# Patient Record
Sex: MALE | Race: WHITE | NOT HISPANIC OR LATINO | Employment: FULL TIME | URBAN - METROPOLITAN AREA
[De-identification: names, ages, dates, MRNs, and addresses within clinical notes are randomized per-mention and may not be internally consistent; named-entity substitution may affect disease eponyms.]

---

## 2021-05-31 ENCOUNTER — RECORDS - HEALTHEAST (OUTPATIENT)
Dept: ADMINISTRATIVE | Facility: CLINIC | Age: 41
End: 2021-05-31

## 2024-11-16 ENCOUNTER — HOSPITAL ENCOUNTER (EMERGENCY)
Facility: CLINIC | Age: 44
Discharge: HOME | End: 2024-11-16
Attending: EMERGENCY MEDICINE | Admitting: EMERGENCY MEDICINE
Payer: COMMERCIAL

## 2024-11-16 ENCOUNTER — APPOINTMENT (OUTPATIENT)
Dept: GENERAL RADIOLOGY | Facility: CLINIC | Age: 44
End: 2024-11-16
Attending: EMERGENCY MEDICINE
Payer: COMMERCIAL

## 2024-11-16 VITALS
OXYGEN SATURATION: 95 % | HEIGHT: 67 IN | WEIGHT: 220 LBS | TEMPERATURE: 97.9 F | DIASTOLIC BLOOD PRESSURE: 84 MMHG | HEART RATE: 88 BPM | RESPIRATION RATE: 16 BRPM | BODY MASS INDEX: 34.53 KG/M2 | SYSTOLIC BLOOD PRESSURE: 152 MMHG

## 2024-11-16 DIAGNOSIS — R07.89 RIGHT-SIDED CHEST WALL PAIN: ICD-10-CM

## 2024-11-16 LAB
ANION GAP SERPL CALCULATED.3IONS-SCNC: 9 MMOL/L (ref 7–15)
ATRIAL RATE - MUSE: 83 BPM
BASOPHILS # BLD AUTO: 0 10E3/UL (ref 0–0.2)
BASOPHILS NFR BLD AUTO: 0 %
BUN SERPL-MCNC: 15.6 MG/DL (ref 6–20)
CALCIUM SERPL-MCNC: 9.9 MG/DL (ref 8.8–10.4)
CHLORIDE SERPL-SCNC: 101 MMOL/L (ref 98–107)
CREAT SERPL-MCNC: 1.17 MG/DL (ref 0.67–1.17)
D DIMER PPP FEU-MCNC: <0.27 UG/ML FEU (ref 0–0.5)
DIASTOLIC BLOOD PRESSURE - MUSE: NORMAL MMHG
EGFRCR SERPLBLD CKD-EPI 2021: 78 ML/MIN/1.73M2
EOSINOPHIL # BLD AUTO: 0.3 10E3/UL (ref 0–0.7)
EOSINOPHIL NFR BLD AUTO: 3 %
ERYTHROCYTE [DISTWIDTH] IN BLOOD BY AUTOMATED COUNT: 13.2 % (ref 10–15)
GLUCOSE SERPL-MCNC: 101 MG/DL (ref 70–99)
HCO3 SERPL-SCNC: 28 MMOL/L (ref 22–29)
HCT VFR BLD AUTO: 44.2 % (ref 40–53)
HGB BLD-MCNC: 14.9 G/DL (ref 13.3–17.7)
HOLD SPECIMEN: 0
IMM GRANULOCYTES # BLD: 0 10E3/UL
IMM GRANULOCYTES NFR BLD: 0 %
INTERPRETATION ECG - MUSE: NORMAL
LYMPHOCYTES # BLD AUTO: 2.4 10E3/UL (ref 0.8–5.3)
LYMPHOCYTES NFR BLD AUTO: 25 %
MCH RBC QN AUTO: 30.1 PG (ref 26.5–33)
MCHC RBC AUTO-ENTMCNC: 33.7 G/DL (ref 31.5–36.5)
MCV RBC AUTO: 89 FL (ref 78–100)
MONOCYTES # BLD AUTO: 0.8 10E3/UL (ref 0–1.3)
MONOCYTES NFR BLD AUTO: 9 %
NEUTROPHILS # BLD AUTO: 6.1 10E3/UL (ref 1.6–8.3)
NEUTROPHILS NFR BLD AUTO: 63 %
NRBC # BLD AUTO: 0 10E3/UL
NRBC BLD AUTO-RTO: 0 /100
P AXIS - MUSE: 29 DEGREES
PLATELET # BLD AUTO: 262 10E3/UL (ref 150–450)
POTASSIUM SERPL-SCNC: 5.5 MMOL/L (ref 3.4–5.3)
PR INTERVAL - MUSE: 148 MS
QRS DURATION - MUSE: 100 MS
QT - MUSE: 338 MS
QTC - MUSE: 397 MS
R AXIS - MUSE: -20 DEGREES
RBC # BLD AUTO: 4.95 10E6/UL (ref 4.4–5.9)
SODIUM SERPL-SCNC: 138 MMOL/L (ref 135–145)
SYSTOLIC BLOOD PRESSURE - MUSE: NORMAL MMHG
T AXIS - MUSE: 35 DEGREES
TROPONIN T SERPL HS-MCNC: <6 NG/L
VENTRICULAR RATE- MUSE: 83 BPM
WBC # BLD AUTO: 9.7 10E3/UL (ref 4–11)

## 2024-11-16 PROCEDURE — 99285 EMERGENCY DEPT VISIT HI MDM: CPT | Mod: 25

## 2024-11-16 PROCEDURE — 80048 BASIC METABOLIC PNL TOTAL CA: CPT | Performed by: EMERGENCY MEDICINE

## 2024-11-16 PROCEDURE — 36415 COLL VENOUS BLD VENIPUNCTURE: CPT | Performed by: EMERGENCY MEDICINE

## 2024-11-16 PROCEDURE — 71046 X-RAY EXAM CHEST 2 VIEWS: CPT

## 2024-11-16 PROCEDURE — 85025 COMPLETE CBC W/AUTO DIFF WBC: CPT | Performed by: EMERGENCY MEDICINE

## 2024-11-16 PROCEDURE — 82310 ASSAY OF CALCIUM: CPT | Performed by: EMERGENCY MEDICINE

## 2024-11-16 PROCEDURE — 85379 FIBRIN DEGRADATION QUANT: CPT | Performed by: EMERGENCY MEDICINE

## 2024-11-16 PROCEDURE — 84484 ASSAY OF TROPONIN QUANT: CPT | Performed by: EMERGENCY MEDICINE

## 2024-11-16 PROCEDURE — 93005 ELECTROCARDIOGRAM TRACING: CPT

## 2024-11-16 ASSESSMENT — ACTIVITIES OF DAILY LIVING (ADL)
ADLS_ACUITY_SCORE: 0

## 2024-11-16 NOTE — ED TRIAGE NOTES
Patient with right sternal chest pain that started last night around 7pm. Was able to sleep, but the pain never went away.

## 2024-11-16 NOTE — ED PROVIDER NOTES
"  Emergency Department Note      History of Present Illness     Chief Complaint   Chest Pain      HPI   Ariel Azevedo is a 44 year old male with history of obstructive sleep apnea and asthma who presents to the ED with a family member for evaluation of chest pain. The patient reports sudden onset right sided chest pain last night at 1900. Ariel states the chest pain has been relatively constant since onset. He notes pain with palpation last night and the pain was worse then. He does carry heavy objects at work and notes he was carrying a heavy box when the pain onset. He took Aspirin at home with no relief. Ariel reports he has been dealing with an itchy throat and was on Suboxone for 9 years before stopping one year ago. Endorses pain with inspiration. He used marijuana \"a long time ago.\" Denies lower extremity edema, abdominal pain, skin change over the chest, cough, fever, or family history of early heart disease. No history of overuse or strain or tobacco use.            Independent Historian   None    Review of External Notes   N/a    Past Medical History     Medical History and Problem List   Lumbago  Onychomycosis  Tinea pedis  Opioid dependence, continuous  Obstructive sleep apnea  Anxiety  Major depressive disorder  Asthma   Migraine    Medications   Propecia  Sumatriptan     Surgical History   ACL reconstruction, bilateral  Appendectomy     Physical Exam     Patient Vitals for the past 24 hrs:   BP Temp Temp src Pulse Resp SpO2 Height Weight   11/16/24 1436 (!) 152/84 -- -- 88 16 95 % -- --   11/16/24 1335 -- -- -- -- -- 93 % -- --   11/16/24 1317 -- -- -- -- -- 96 % -- --   11/16/24 1305 -- -- -- -- -- 93 % -- --   11/16/24 1247 -- -- -- -- -- 93 % -- --   11/16/24 1235 -- -- -- -- -- 93 % -- --   11/16/24 1217 -- -- -- 89 -- 95 % -- --   11/16/24 1147 -- -- -- 85 18 -- -- --   11/16/24 1143 -- -- -- -- -- 96 % -- --   11/16/24 1142 (!) 154/89 97.9  F (36.6  C) Temporal 83 18 -- 1.702 m (5' 7\") " 99.8 kg (220 lb)     Physical Exam  General: Alert and cooperative with exam. Patient in mild distress. Normal mentation.  Head:  Scalp is NC/AT  Eyes:  No scleral icterus, PERRL  ENT:  The external nose and ears are normal.   Neck:  Normal range of motion without rigidity.  CV:  Regular rate and rhythm    No pathologic murmur   Resp:  Breath sounds are clear bilaterally    Non-labored, no retractions or accessory muscle use  GI:  Abdomen is soft, no distension, no tenderness. No peritoneal signs  MS:  No lower extremity edema   Skin:  Warm and dry, No rash or lesions noted.  Neuro: Oriented x 3. No gross motor deficits.     Diagnostics     Lab Results   Labs Ordered and Resulted from Time of ED Arrival to Time of ED Departure   BASIC METABOLIC PANEL - Abnormal       Result Value    Sodium 138      Potassium 5.5 (*)     Chloride 101      Carbon Dioxide (CO2) 28      Anion Gap 9      Urea Nitrogen 15.6      Creatinine 1.17      GFR Estimate 78      Calcium 9.9      Glucose 101 (*)    D DIMER QUANTITATIVE - Normal    D-Dimer Quantitative <0.27     TROPONIN T, HIGH SENSITIVITY - Normal    Troponin T, High Sensitivity <6     CBC WITH PLATELETS AND DIFFERENTIAL    WBC Count 9.7      RBC Count 4.95      Hemoglobin 14.9      Hematocrit 44.2      MCV 89      MCH 30.1      MCHC 33.7      RDW 13.2      Platelet Count 262      % Neutrophils 63      % Lymphocytes 25      % Monocytes 9      % Eosinophils 3      % Basophils 0      % Immature Granulocytes 0      NRBCs per 100 WBC 0      Absolute Neutrophils 6.1      Absolute Lymphocytes 2.4      Absolute Monocytes 0.8      Absolute Eosinophils 0.3      Absolute Basophils 0.0      Absolute Immature Granulocytes 0.0      Absolute NRBCs 0.0         Imaging   Chest XR,  PA & LAT   Final Result   IMPRESSION:       Mild left basilar atelectasis. Lungs are otherwise clear. No evidence of pneumonia. No pleural effusions or pneumothorax. Normal pulmonary vascularity and cardiac size.           EKG   ECG taken at 1133, ECG read at 1133  Normal sinus rhythm  Low voltage QRS  Cannot rule out Anterior infarct, age undetermined  Abnormal ECG    Rate 83 bpm. AZ interval 148 ms. QRS duration 100 ms. QT/QTc 338/397 ms. P-R-T axes 29 -20 35.    Independent Interpretation   CXR: No pneumothorax, infiltrate, or pleural effusion.    ED Course      Medications Administered   Medications - No data to display    Procedures   Procedures     Discussion of Management   None    ED Course   ED Course as of 11/16/24 2117   Sat Nov 16, 2024   1150 I obtained history and examined the patient as noted above.    1359 I rechecked the patient and explained findings.        Additional Documentation  None    Medical Decision Making / Diagnosis     CMS Diagnoses: None    MIPS       None    MDM   Ariel Azevedo is a 44 year old male who presents with chest pain.  The work up in the Emergency Department is negative.  I considered a broad differential diagnosis in this patient including life-threatening etiologies such as acute coronary syndrome, myocardial infarction, pulmonary embolism, acute aortic dissection, myocarditis, pericarditis, acute valvular insufficiency amongst others.  Other causes considered for this patient included pneumonia, pneumothorax, chest wall source, pericarditis, pleurisy, esophageal spasm, etc.  No serious etiology for the chest pain were detected today during this visit.  EKG without evidence of acute ischemia, infarction, or significant arrhythmia and troponin is undetectable; low clinical suspicion for ACS.  D-dimer is negative; PE unlikely.  Labs notable only for minimal elevation of potassium; patient notes that he eats multiple bananas a day; recommended adequate hydration.  Patient's presentation is most consistent with right-sided chest wall pain.  Supportive care and return precautions discussed.  Follow-up per discharge instructions.    Disposition   The patient was discharged.     Diagnosis      ICD-10-CM    1. Right-sided chest wall pain  R07.89            Discharge Medications   Discharge Medication List as of 11/16/2024  2:30 PM            Scribe Disclosure:  I, Faviola Diamond, am serving as a scribe at 12:35 PM on 11/16/2024 to document services personally performed by Paul Jerome DO based on my observations and the provider's statements to me.        Paul Jerome DO  11/16/24 2123